# Patient Record
Sex: MALE | Race: OTHER | HISPANIC OR LATINO | URBAN - METROPOLITAN AREA
[De-identification: names, ages, dates, MRNs, and addresses within clinical notes are randomized per-mention and may not be internally consistent; named-entity substitution may affect disease eponyms.]

---

## 2019-11-18 ENCOUNTER — EMERGENCY (EMERGENCY)
Facility: HOSPITAL | Age: 44
LOS: 1 days | Discharge: HOME | End: 2019-11-18
Attending: STUDENT IN AN ORGANIZED HEALTH CARE EDUCATION/TRAINING PROGRAM
Payer: SELF-PAY

## 2019-11-18 VITALS
OXYGEN SATURATION: 98 % | WEIGHT: 169.98 LBS | DIASTOLIC BLOOD PRESSURE: 78 MMHG | TEMPERATURE: 98 F | SYSTOLIC BLOOD PRESSURE: 121 MMHG | HEIGHT: 66 IN | HEART RATE: 67 BPM | RESPIRATION RATE: 20 BRPM

## 2019-11-18 DIAGNOSIS — R07.9 CHEST PAIN, UNSPECIFIED: ICD-10-CM

## 2019-11-18 LAB
ALBUMIN SERPL ELPH-MCNC: 4.6 G/DL — SIGNIFICANT CHANGE UP (ref 3.3–5.2)
ALP SERPL-CCNC: 49 U/L — SIGNIFICANT CHANGE UP (ref 40–120)
ALT FLD-CCNC: 30 U/L — SIGNIFICANT CHANGE UP
ANION GAP SERPL CALC-SCNC: 15 MMOL/L — SIGNIFICANT CHANGE UP (ref 5–17)
AST SERPL-CCNC: 28 U/L — SIGNIFICANT CHANGE UP
BASOPHILS # BLD AUTO: 0.02 K/UL — SIGNIFICANT CHANGE UP (ref 0–0.2)
BASOPHILS NFR BLD AUTO: 0.3 % — SIGNIFICANT CHANGE UP (ref 0–2)
BILIRUB SERPL-MCNC: 0.4 MG/DL — SIGNIFICANT CHANGE UP (ref 0.4–2)
BUN SERPL-MCNC: 17 MG/DL — SIGNIFICANT CHANGE UP (ref 8–20)
CALCIUM SERPL-MCNC: 9.1 MG/DL — SIGNIFICANT CHANGE UP (ref 8.6–10.2)
CHLORIDE SERPL-SCNC: 103 MMOL/L — SIGNIFICANT CHANGE UP (ref 98–107)
CK MB CFR SERPL CALC: 4.9 NG/ML — SIGNIFICANT CHANGE UP (ref 0–6.7)
CK SERPL-CCNC: 347 U/L — HIGH (ref 30–200)
CO2 SERPL-SCNC: 21 MMOL/L — LOW (ref 22–29)
CREAT SERPL-MCNC: 0.96 MG/DL — SIGNIFICANT CHANGE UP (ref 0.5–1.3)
EOSINOPHIL # BLD AUTO: 0.1 K/UL — SIGNIFICANT CHANGE UP (ref 0–0.5)
EOSINOPHIL NFR BLD AUTO: 1.5 % — SIGNIFICANT CHANGE UP (ref 0–6)
GLUCOSE SERPL-MCNC: 92 MG/DL — SIGNIFICANT CHANGE UP (ref 70–115)
HCT VFR BLD CALC: 40.7 % — SIGNIFICANT CHANGE UP (ref 39–50)
HGB BLD-MCNC: 13.7 G/DL — SIGNIFICANT CHANGE UP (ref 13–17)
IMM GRANULOCYTES NFR BLD AUTO: 0.3 % — SIGNIFICANT CHANGE UP (ref 0–1.5)
LYMPHOCYTES # BLD AUTO: 3.2 K/UL — SIGNIFICANT CHANGE UP (ref 1–3.3)
LYMPHOCYTES # BLD AUTO: 48.8 % — HIGH (ref 13–44)
MCHC RBC-ENTMCNC: 30.2 PG — SIGNIFICANT CHANGE UP (ref 27–34)
MCHC RBC-ENTMCNC: 33.7 GM/DL — SIGNIFICANT CHANGE UP (ref 32–36)
MCV RBC AUTO: 89.8 FL — SIGNIFICANT CHANGE UP (ref 80–100)
MONOCYTES # BLD AUTO: 0.41 K/UL — SIGNIFICANT CHANGE UP (ref 0–0.9)
MONOCYTES NFR BLD AUTO: 6.3 % — SIGNIFICANT CHANGE UP (ref 2–14)
NEUTROPHILS # BLD AUTO: 2.81 K/UL — SIGNIFICANT CHANGE UP (ref 1.8–7.4)
NEUTROPHILS NFR BLD AUTO: 42.8 % — LOW (ref 43–77)
NT-PROBNP SERPL-SCNC: 12 PG/ML — SIGNIFICANT CHANGE UP (ref 0–300)
PLATELET # BLD AUTO: 253 K/UL — SIGNIFICANT CHANGE UP (ref 150–400)
POTASSIUM SERPL-MCNC: 3.7 MMOL/L — SIGNIFICANT CHANGE UP (ref 3.5–5.3)
POTASSIUM SERPL-SCNC: 3.7 MMOL/L — SIGNIFICANT CHANGE UP (ref 3.5–5.3)
PROT SERPL-MCNC: 7.5 G/DL — SIGNIFICANT CHANGE UP (ref 6.6–8.7)
RBC # BLD: 4.53 M/UL — SIGNIFICANT CHANGE UP (ref 4.2–5.8)
RBC # FLD: 12.1 % — SIGNIFICANT CHANGE UP (ref 10.3–14.5)
SODIUM SERPL-SCNC: 139 MMOL/L — SIGNIFICANT CHANGE UP (ref 135–145)
TROPONIN T SERPL-MCNC: <0.01 NG/ML — SIGNIFICANT CHANGE UP (ref 0–0.06)
WBC # BLD: 6.56 K/UL — SIGNIFICANT CHANGE UP (ref 3.8–10.5)
WBC # FLD AUTO: 6.56 K/UL — SIGNIFICANT CHANGE UP (ref 3.8–10.5)

## 2019-11-18 PROCEDURE — 71046 X-RAY EXAM CHEST 2 VIEWS: CPT | Mod: 26

## 2019-11-18 PROCEDURE — 93010 ELECTROCARDIOGRAM REPORT: CPT | Mod: 76

## 2019-11-18 PROCEDURE — 99220: CPT

## 2019-11-18 RX ORDER — METOPROLOL TARTRATE 50 MG
12.5 TABLET ORAL
Refills: 0 | Status: ACTIVE | OUTPATIENT
Start: 2019-11-18 | End: 2020-10-16

## 2019-11-18 RX ORDER — ASPIRIN/CALCIUM CARB/MAGNESIUM 324 MG
325 TABLET ORAL ONCE
Refills: 0 | Status: COMPLETED | OUTPATIENT
Start: 2019-11-18 | End: 2019-11-18

## 2019-11-18 RX ADMIN — Medication 325 MILLIGRAM(S): at 20:37

## 2019-11-18 RX ADMIN — Medication 12.5 MILLIGRAM(S): at 21:40

## 2019-11-18 NOTE — CONSULT NOTE ADULT - PROBLEM SELECTOR RECOMMENDATION 9
Monitor in CDU overnight, check CBC, BMP, trend CE x2, ECG in AM  Check FLP and A1C in AM  Will schedule for TTE in and keep NPO for Cardiac CT in AM  Start Metoprolol 12.5 mg oral BID with parameters  case d/w Dr. Segura

## 2019-11-18 NOTE — CONSULT NOTE ADULT - SUBJECTIVE AND OBJECTIVE BOX
[place] [facility]                                                        Office: [address] [address]                                                       Telephone: [phone]. Fax:[phone]                                                              CARDIOLOGY CONSULTATION NOTE                                                                                        Chief complaint:  Chest pain      HPI:     REVIEW OF SYMPTOMS: Cardiovascular:  See HPI. No chest pain,  No dyspnea,  No syncope,  No palpitations, No dizziness, No Orthopnea,      No Paroxsymal nocturnal dyspnea;  Respiratory:  No Dyspnea, No cough,     Genitourinary:  No dysuria, no hematuria; Gastrointestinal:  No nausea, no vomiting. No diarrhea.  No abdominal pain. No dark color stool, no melena ; Neurological: No headache, no dizziness, no slurred speech;  Psychiatric: No agitation, no anxiety.  ALL OTHER REVIEW OF SYSTEMS ARE NEGATIVE.    ALLERGIES: No Known Allergies    CURRENT MEDICATIONS:  Aspirin    HOME MEDICATIONS:  Pantoprazole    PAST MEDICAL HISTORY  None    PAST SURGICAL HISTORY  None    FAMILY HISTORY: Patient denies    SOCIAL HISTORY:  Denies smoking/alcohol/drugs    Vital Signs Last 24 Hrs  T(C): 36.7 (18 Nov 2019 19:05), Max: 36.7 (18 Nov 2019 19:05)  T(F): 98 (18 Nov 2019 19:05), Max: 98 (18 Nov 2019 19:05)  HR: 67 (18 Nov 2019 19:05) (67 - 67)  BP: 121/78 (18 Nov 2019 19:05) (121/78 - 121/78)  BP(mean): --  RR: 20 (18 Nov 2019 19:05) (20 - 20)  SpO2: 98% (18 Nov 2019 19:05) (98% - 98%)    PHYSICAL EXAM:  Constitutional: Comfortable and no acute distress   HEENT: Atraumatic, EOMI, neck is supple no JVD or carotid bruit   CNS: A & O x 3, Motor 5/5 b/l, No focal deficits   Respiratory: CTA b/l  Cardiovascular: S1S2 RRR, No murmur/rubs   Gastrointestinal: Soft non-tender and non distended, +Bowel sounds  Extremities: No edema, warm b/l   Psychiatric: Calm, no agitation  Skin: No skin rash/ulcers visualized to face, hands or feet.    LABS:    INTERPRETATION OF TELEMETRY: Reviewed by me.   ECG: NSR @      RADIOLOGY & ADDITIONAL STUDIES:    X-ray:  reviewed by me.   CT scan:   MRI:   ECHO FINDINGS: Date:                : LVEF=          ; RV function:       ; Valvular abnormalities: No significant valvular abnormality.  Mitral valve:           ;  Aortic valve:              ;Tricuspid valve:         ; Pulmonary pressures:        mm Hg. Pericardium:      STRESS  FINDINGS: Date:            ;      CATHETERIZATION FINDINGS:  Date:              :  LAD:                       ;  LCx:                        ; RCA:                ; [place] [facility]                                                        Office: [address] [address]                                                       Telephone: [phone]. Fax:[phone]                                                              CARDIOLOGY CONSULTATION NOTE                                                                                        Chief complaint:  Chest pain      HPI: Patient is a 44 yo M arrived from home with c/o left sided chest pain.  States that pain started about two weeks ago, left sided, sharp, 6/10 in intensity, and with occasional radiation to his back.  Patient first thought it's reflux related, started on Pantoprazole, but symptoms returned after minimal relief.  Patient admits to associated dyspnea and SOB as well. Denies HA, diaphoresis, abdominal pain, PND, orthopnea, leg edema, paresthesias or generalized weakness.  No family history of heart disease.  Currently, chest pain free.            REVIEW OF SYMPTOMS: Cardiovascular:  See HPI. + chest pain, + dyspnea, No syncope, No palpitations, No dizziness, No Orthopnea, No Paroxsymal nocturnal dyspnea;  Respiratory:  + Dyspnea, No cough, fever, or chills     Genitourinary:  No dysuria, no hematuria;   Gastrointestinal: + heart burn, No nausea, no vomiting, No diarrhea, No abdominal pain, No dark color stool, no melena;   Neurological: No headache, no dizziness, no slurred speech;    Psychiatric: No agitation, no anxiety.  ALL OTHER REVIEW OF SYSTEMS ARE NEGATIVE.    ALLERGIES: No Known Allergies    CURRENT MEDICATIONS:  Aspirin    HOME MEDICATIONS:  Pantoprazole    PAST MEDICAL HISTORY  None    PAST SURGICAL HISTORY  None    FAMILY HISTORY: Patient denies    SOCIAL HISTORY:  Denies smoking/alcohol/drugs    Vital Signs Last 24 Hrs  T(C): 36.7 (18 Nov 2019 19:05), Max: 36.7 (18 Nov 2019 19:05)  T(F): 98 (18 Nov 2019 19:05), Max: 98 (18 Nov 2019 19:05)  HR: 67 (18 Nov 2019 19:05) (67 - 67)  BP: 121/78 (18 Nov 2019 19:05) (121/78 - 121/78)  BP(mean): --  RR: 20 (18 Nov 2019 19:05) (20 - 20)  SpO2: 98% (18 Nov 2019 19:05) (98% - 98%)    PHYSICAL EXAM:  Constitutional: Comfortable and no acute distress   HEENT: Atraumatic, EOMI, neck is supple no JVD or carotid bruit   CNS: A & O x 3, Motor 5/5 b/l, No focal deficits   Respiratory: CTA b/l  Cardiovascular: S1S2 RRR, No murmur/rubs   Gastrointestinal: Soft non-tender and non distended, +Bowel sounds  Extremities: No edema, warm b/l   Psychiatric: Calm, no agitation  Skin: No skin rash/ulcers visualized to face, hands or feet.    LABS:    INTERPRETATION OF TELEMETRY: Reviewed by me.   ECG: NSR @ 73bpm with incomplete RBBB, no acute T or ST changes

## 2019-11-18 NOTE — ED ADULT NURSE NOTE - OBJECTIVE STATEMENT
pt presents to ed a&ox3, breaths even and unlabored, in no acute distress. received pt on cardiac monitor and , maintaining nsr and airway on room air. pt c/o constant L sided chest pain x 2 weeks with intermittent sob on exertion, more so while working. denies radiation of pain. pt denies cardiac hx.

## 2019-11-18 NOTE — CONSULT NOTE ADULT - ASSESSMENT
43 yo M with intermittent chest pain associated with dyspnea and back pain for past two weeks r/o cardiac etiology

## 2019-11-18 NOTE — ED PROVIDER NOTE - CLINICAL SUMMARY MEDICAL DECISION MAKING FREE TEXT BOX
Pt with recurrent exertional CP, will obtain labs, X-ray and likely place in OBS for cardiac evaluation.

## 2019-11-18 NOTE — ED ADULT NURSE NOTE - NSIMPLEMENTINTERV_GEN_ALL_ED
Implemented All Fall with Harm Risk Interventions:  [place] to call system. Call bell, personal items and telephone within reach. Instruct patient to call for assistance. Room bathroom lighting operational. Non-slip footwear when patient is off stretcher. Physically safe environment: no spills, clutter or unnecessary equipment. Stretcher in lowest position, wheels locked, appropriate side rails in place. Provide visual cue, wrist band, yellow gown, etc. Monitor gait and stability. Monitor for mental status changes and reorient to person, place, and time. Review medications for side effects contributing to fall risk. Reinforce activity limits and safety measures with patient and family. Provide visual clues: red socks.

## 2019-11-18 NOTE — ED ADULT NURSE NOTE - CAS EDN DISCHARGE ASSESSMENT
Dressing clean and dry/Patient baseline mental status/Symptoms improved/Awake/Alert and oriented to person, place and time

## 2019-11-18 NOTE — ED STATDOCS - PROGRESS NOTE DETAILS
45 y/o M pt with no significant PMHx presents to the ED c/o intermittent CP, onset 2 weeks ago. Associated symptoms include SOB x 1 week. He reports that the pain onset with no precipitating factors. The CP and SOB occur at rest without exertion. Yesterday the patient had symptoms that felt like GERD, prompting him to come to the ED today. Patient has never had a past episode of this CP occurring. Former smoker. He has also never had a stress test performed. Focused eval, protocol orders entered. Pt to be moved to main ED for complete evaluation by another provider.

## 2019-11-18 NOTE — ED PROVIDER NOTE - OBJECTIVE STATEMENT
45 y/o M pt with no significant PMHx presents to the ED c/o a "squeezing" 6/10 chest discomfort that started 2 weeks ago, as well as intermittent dyspnea that started last week. Pt expresses that yesterday he also began to have GI related symptoms, such as heart burn so he assumed he just had gas or had to burp so his daughter gave him Omeprazole which provided minimal relief. Currently he has no symptoms, but adds that when his symptoms are present it occasionally radiates to his back, and it is often relieved with rest but aggravated with exertion. No cardiac FHx. Non-Tobacco User. No further complaints at this time.

## 2019-11-19 VITALS
TEMPERATURE: 98 F | HEART RATE: 77 BPM | OXYGEN SATURATION: 95 % | DIASTOLIC BLOOD PRESSURE: 74 MMHG | RESPIRATION RATE: 18 BRPM | SYSTOLIC BLOOD PRESSURE: 107 MMHG

## 2019-11-19 DIAGNOSIS — R07.9 CHEST PAIN, UNSPECIFIED: ICD-10-CM

## 2019-11-19 LAB
ANION GAP SERPL CALC-SCNC: 12 MMOL/L — SIGNIFICANT CHANGE UP (ref 5–17)
BUN SERPL-MCNC: 19 MG/DL — SIGNIFICANT CHANGE UP (ref 8–20)
CALCIUM SERPL-MCNC: 8.8 MG/DL — SIGNIFICANT CHANGE UP (ref 8.6–10.2)
CHLORIDE SERPL-SCNC: 104 MMOL/L — SIGNIFICANT CHANGE UP (ref 98–107)
CHOLEST SERPL-MCNC: 164 MG/DL — SIGNIFICANT CHANGE UP (ref 110–199)
CO2 SERPL-SCNC: 23 MMOL/L — SIGNIFICANT CHANGE UP (ref 22–29)
CREAT SERPL-MCNC: 0.93 MG/DL — SIGNIFICANT CHANGE UP (ref 0.5–1.3)
D DIMER BLD IA.RAPID-MCNC: <150 NG/ML DDU — SIGNIFICANT CHANGE UP
GLUCOSE SERPL-MCNC: 104 MG/DL — SIGNIFICANT CHANGE UP (ref 70–115)
HBA1C BLD-MCNC: 5.6 % — SIGNIFICANT CHANGE UP (ref 4–5.6)
HDLC SERPL-MCNC: 36 MG/DL — LOW
LIPID PNL WITH DIRECT LDL SERPL: 98 MG/DL — SIGNIFICANT CHANGE UP
POTASSIUM SERPL-MCNC: 3.7 MMOL/L — SIGNIFICANT CHANGE UP (ref 3.5–5.3)
POTASSIUM SERPL-SCNC: 3.7 MMOL/L — SIGNIFICANT CHANGE UP (ref 3.5–5.3)
SODIUM SERPL-SCNC: 139 MMOL/L — SIGNIFICANT CHANGE UP (ref 135–145)
TOTAL CHOLESTEROL/HDL RATIO MEASUREMENT: 5 RATIO — SIGNIFICANT CHANGE UP (ref 3.4–9.6)
TRIGL SERPL-MCNC: 151 MG/DL — SIGNIFICANT CHANGE UP (ref 10–200)
TROPONIN T SERPL-MCNC: <0.01 NG/ML — SIGNIFICANT CHANGE UP (ref 0–0.06)
TROPONIN T SERPL-MCNC: <0.01 NG/ML — SIGNIFICANT CHANGE UP (ref 0–0.06)
TSH SERPL-MCNC: 1.08 UIU/ML — SIGNIFICANT CHANGE UP (ref 0.27–4.2)

## 2019-11-19 PROCEDURE — 75574 CT ANGIO HRT W/3D IMAGE: CPT | Mod: 26

## 2019-11-19 PROCEDURE — 93306 TTE W/DOPPLER COMPLETE: CPT

## 2019-11-19 PROCEDURE — 71046 X-RAY EXAM CHEST 2 VIEWS: CPT

## 2019-11-19 PROCEDURE — G0378: CPT

## 2019-11-19 PROCEDURE — 85379 FIBRIN DEGRADATION QUANT: CPT

## 2019-11-19 PROCEDURE — 80053 COMPREHEN METABOLIC PANEL: CPT

## 2019-11-19 PROCEDURE — 84484 ASSAY OF TROPONIN QUANT: CPT

## 2019-11-19 PROCEDURE — 99284 EMERGENCY DEPT VISIT MOD MDM: CPT | Mod: 25

## 2019-11-19 PROCEDURE — 36415 COLL VENOUS BLD VENIPUNCTURE: CPT

## 2019-11-19 PROCEDURE — 85027 COMPLETE CBC AUTOMATED: CPT

## 2019-11-19 PROCEDURE — 80048 BASIC METABOLIC PNL TOTAL CA: CPT

## 2019-11-19 PROCEDURE — 80061 LIPID PANEL: CPT

## 2019-11-19 PROCEDURE — 93005 ELECTROCARDIOGRAM TRACING: CPT

## 2019-11-19 PROCEDURE — 93010 ELECTROCARDIOGRAM REPORT: CPT | Mod: 76

## 2019-11-19 PROCEDURE — 99284 EMERGENCY DEPT VISIT MOD MDM: CPT

## 2019-11-19 PROCEDURE — 82550 ASSAY OF CK (CPK): CPT

## 2019-11-19 PROCEDURE — 83036 HEMOGLOBIN GLYCOSYLATED A1C: CPT

## 2019-11-19 PROCEDURE — 84443 ASSAY THYROID STIM HORMONE: CPT

## 2019-11-19 PROCEDURE — 82553 CREATINE MB FRACTION: CPT

## 2019-11-19 PROCEDURE — 99217: CPT

## 2019-11-19 PROCEDURE — 75574 CT ANGIO HRT W/3D IMAGE: CPT

## 2019-11-19 PROCEDURE — 83880 ASSAY OF NATRIURETIC PEPTIDE: CPT

## 2019-11-19 RX ORDER — METOPROLOL TARTRATE 50 MG
50 TABLET ORAL ONCE
Refills: 0 | Status: DISCONTINUED | OUTPATIENT
Start: 2019-11-19 | End: 2019-11-19

## 2019-11-19 NOTE — ED ADULT NURSE REASSESSMENT NOTE - NS ED NURSE REASSESS COMMENT FT1
report given to ROSALINO Jimenez in ED observation. pt transported to CDU6 by RN in stable condition.

## 2019-11-19 NOTE — ED CDU PROVIDER DISPOSITION NOTE - PATIENT PORTAL LINK FT
You can access the FollowMyHealth Patient Portal offered by [facility] by registering at the following website: [email or web address]. By joining [facility]’s FollowMyHealth portal, you will also be able to view your health information using other applications (apps) compatible with our system.

## 2019-11-19 NOTE — ED CDU PROVIDER SUBSEQUENT DAY NOTE - HISTORY
45 y/o M pt with no significant PMHx presents to the ED c/o a "squeezing" 6/10 chest discomfort that started 2 weeks ago, as well as intermittent dyspnea that started last week.  take Gi omeprazole not helping much .pain on the left side of the chest not radiating and on and off , denies any chest pain while in obs

## 2019-11-19 NOTE — ED ADULT NURSE REASSESSMENT NOTE - GENERAL PATIENT STATE
smiling/interactive/comfortable appearance/cooperative/resting/sleeping/improvement verbalized/family/SO at bedside

## 2019-11-19 NOTE — ED ADULT NURSE REASSESSMENT NOTE - NS ED NURSE REASSESS COMMENT FT1
Pt alert and oriented, no apparent distress noted at this time. Pt handed off to Reece JERRY in stable condition.

## 2019-11-19 NOTE — ED CDU PROVIDER DISPOSITION NOTE - CLINICAL COURSE
45 y/o M pt with no significant PMHx presents to the ED c/o a "squeezing" 6/10 chest discomfort that started 2 weeks ago, as well as intermittent dyspnea that started last week. Pt placed in CDU for ACS w/u. Pt with trops x 3 WNL, EKG showing RBB, Cardiac CT w/o emergent findings. Pt evaluated by SS cards who cleared pt to follow up in office with Dr. Segura. Pt advised to return to ED if symptoms return.

## 2019-11-19 NOTE — ED CDU PROVIDER SUBSEQUENT DAY NOTE - MEDICAL DECISION MAKING DETAILS
45 y/o M pt with no significant PMHx presents to the ED c/o a "squeezing" 6/10 chest discomfort that started 2 weeks ago, placed on obs for chest pain work up   card consult - cta coronary , TTE , labs - cardiac mentoring re eval

## 2019-11-19 NOTE — PROGRESS NOTE ADULT - PROBLEM SELECTOR PLAN 1
Patient came with chest pain and dyspnea.  Normal Coronary CTA.  ECHO- Normal except for Bicuspid Aortic Valve.  Recommendations:  Findings discussed with the patient and his daughter  Suggest D dimer if negative may d/c home and f/u with Dr Segura in the office

## 2019-11-19 NOTE — ED ADULT NURSE REASSESSMENT NOTE - NS ED NURSE REASSESS COMMENT FT1
assumed care of pt @ 0000, report received from Nieves JERRY, charting as noted. pt AOx4 in NAD. pt denies any chest pain or SOB at this time. HR is sinus rhythm to sinus luis on cardiac monitor. Lung sounds are clear b/l, abd is soft and nontender with positive bowel sounds in all four quadrants, skin is warm, dry and appropriate for age and race. pt educated on plan of care and observation stay. Plan of care taught back to RN. Proficiency determined from successful pt teach back. Pt oriented to unit, staff, and room. Pt reeducated on call bell use. Bed locked in lowest position, call bell within reach. All questions and concerns addressed.     pt placed on observation for serial trops/ekgs, TTE, and CTA. Pt verbalizes understanding of NPO diet for tests in AM.

## 2019-11-19 NOTE — PROGRESS NOTE ADULT - SUBJECTIVE AND OBJECTIVE BOX
[place] [facility]                                                        Office: [address] [address]                                                       Telephone: [phone]. Fax:[phone]                                                                             PROGRESS NOTE    Subjective:  Patient is feeling OK. No Chest pain.     Review of symptoms:   Cardiac:  No chest pain. No dyspnea. No palpitations.  Respiratory:no cough. No dyspnea  Gastrointestinal: No diarrhea. No abdominal pain. No bleeding.   Neuro: No focal neuro complaints.      	  Vitals:  T(C): 36.7 (11-19-19 @ 10:11), Max: 36.7 (11-18-19 @ 19:05)  HR: 57 (11-19-19 @ 10:11) (57 - 70)  BP: 104/67 (11-19-19 @ 10:11) (104/67 - 133/80)  RR: 18 (11-19-19 @ 10:11) (18 - 20)  SpO2: 96% (11-19-19 @ 10:11) (96% - 98%)  Wt(kg): --  I&O's Summary    Weight (kg): 77.1 (11-18 @ 19:05)    PHYSICAL EXAM:  Appearance: Comfortable. No acute distress  HEENT:  Head and neck: Atraumatic. Normocephalic. , Neck is supple. No JVD,   Neurologic: Alert and awake, Grossly nonfocal.   Lymphatic: No cervical lymphadenopathy  Cardiovascular: Normal S1 S2, No murmurs. No JVD,   Respiratory: Lungs clear to auscultation  Gastrointestinal:  Soft, Non-tender, + BS  Lower Extremities: No edema  Psychiatry: Patient is calm. No agitation.  Skin: No rashes.    CURRENT MEDICATIONS:    MEDICATIONS  (STANDING):  metoprolol tartrate 12.5 milliGRAM(s) Oral two times a day      LABS:	 	                          13.7   6.56  )-----------( 253      ( 18 Nov 2019 20:38 )             40.7     11-19    139  |  104  |  19.0  ----------------------------<  104  3.7   |  23.0  |  0.93    Ca    8.8      19 Nov 2019 05:14    TPro  7.5  /  Alb  4.6  /  TBili  0.4  /  DBili  x   /  AST  28  /  ALT  30  /  AlkPhos  49  11-18    proBNP: Serum Pro-Brain Natriuretic Peptide: 12 pg/mL (11-18 @ 20:38)    Lipid Profile: Date: 11-19 @ 05:14  Total cholesterol 164; Direct LDL: 98; HDL: 36; Triglycerides:151    CARDIAC MARKERS ( 19 Nov 2019 05:14 )  x     / <0.01 ng/mL / x     / x     / x      CARDIAC MARKERS ( 19 Nov 2019 01:11 )  x     / <0.01 ng/mL / x     / x     / x      CARDIAC MARKERS ( 18 Nov 2019 20:38 )  x     / <0.01 ng/mL / 347 U/L / x     / 4.9 ng/mL      LIVER FUNCTIONS - ( 18 Nov 2019 20:38 )  Alb: 4.6 g/dL / Pro: 7.5 g/dL / ALK PHOS: 49 U/L / ALT: 30 U/L / AST: 28 U/L / GGT: x             TELEMETRY: Reviewed  - No significant arrhythmias    ECG:

## 2019-11-19 NOTE — ED ADULT NURSE REASSESSMENT NOTE - NS ED NURSE REASSESS COMMENT FT1
RN called [facility] to verify lab and med orders. pt scheduled for two doses of metoprolol in AM. RN received order to cancel one time 50mg dose. Pt scheduled for labs at 0400 and 0600. Per cricket SOTOMAYOR to collect all labs @ 0400. Pt in NAD at this time. safety maintained. all needs met at this time.

## 2019-11-19 NOTE — ED CDU PROVIDER SUBSEQUENT DAY NOTE - ATTENDING CONTRIBUTION TO CARE
I, Sky Powell, performed the initial face to face bedside interview with this patient regarding history of present illness, review of symptoms and relevant past medical, social and family history.  I completed an independent physical examination.  I was the initial provider who evaluated this patient. I have signed out the follow up of any pending tests (i.e. labs, radiological studies) to the ACP.  I have communicated the patient’s plan of care and disposition with the ACP.

## 2020-06-21 NOTE — ED ADULT NURSE REASSESSMENT NOTE - RESPIRATORY ASSESSMENT
no chills/no chest pain, no SOB, no palpitations, no cough, no change in bowel/urinary habits./no fever
WDL
WDL

## 2021-03-27 PROBLEM — Z78.9 OTHER SPECIFIED HEALTH STATUS: Chronic | Status: ACTIVE | Noted: 2019-11-18

## 2021-03-30 ENCOUNTER — APPOINTMENT (OUTPATIENT)
Dept: DISASTER EMERGENCY | Facility: OTHER | Age: 46
End: 2021-03-30
Payer: COMMERCIAL

## 2021-03-30 PROCEDURE — 0011A: CPT

## 2021-04-27 ENCOUNTER — APPOINTMENT (OUTPATIENT)
Dept: DISASTER EMERGENCY | Facility: OTHER | Age: 46
End: 2021-04-27
Payer: COMMERCIAL

## 2021-04-27 PROCEDURE — 0012A: CPT

## 2024-01-16 NOTE — ED ADULT NURSE REASSESSMENT NOTE - PERIPHERAL VASCULAR WDL
up his cream for scrotal itching and he would like the prescription sent to [facility] pharmacy at [facility].  He denies fever.  He denies dysuria.  He denies diarrhea or constipation.  He denies concern for STD and states that he is in a monogamous relationship and has never had an STD.  His vital signs are normal and stable in triage.  He denies pharyngitis.  He denies skin rash.  He denies lesions on penis or scrotum but states it just itches.  He has no other cause for concern or complaint.  All vital signs are normal and stable.      History provided by:  Patient   used: No                        No data recorded                Patient History   No past medical history on file.  No past surgical history on file.  No family history on file.  Social History     Tobacco Use    Smoking status: Not on file    Smokeless tobacco: Not on file   Substance Use Topics    Alcohol use: Not on file    Drug use: Not on file       Physical Exam   ED Triage Vitals [01/15/24 2000]   Temp Heart Rate Resp BP   36.6 °C (97.9 °F) 88 18 115/76      SpO2 Temp src Heart Rate Source Patient Position   95 % -- -- --      BP Location FiO2 (%)     -- --       Physical Exam  Constitutional:       Appearance: Normal appearance.   HENT:      Head: Normocephalic and atraumatic.      Right Ear: Tympanic membrane normal.      Nose: Nose normal.      Mouth/Throat:      Mouth: Mucous membranes are moist.   Cardiovascular:      Rate and Rhythm: Normal rate and regular rhythm.      Pulses: Normal pulses.      Heart sounds: Normal heart sounds.   Pulmonary:      Effort: Pulmonary effort is normal.      Breath sounds: Normal breath sounds.   Abdominal:      General: Abdomen is flat.      
Palpations: Abdomen is soft.   Genitourinary:     Penis: Normal.       Testes: Normal.   Musculoskeletal:         General: Normal range of motion.      Cervical back: Normal range of motion and neck supple.   Skin:     General: Skin is warm and dry.      Capillary Refill: Capillary refill takes less than 2 seconds.   Neurological:      General: No focal deficit present.      Mental Status: He is alert and oriented to person, place, and time.   Psychiatric:         Mood and Affect: Mood normal.         Behavior: Behavior normal.         ED Course & MDM   Diagnoses as of 01/15/24 2048   Scrotal itching       Medical Decision Making  I requested appointment for primary care.  Patient received his refill of prescription for itching of the scrotum that he never filled in the first place.  His vital signs were normal and stable.  I did not appreciate lesion to scrotum or penis.  He was denying dysuria or hematuria.  He was denying flank pain.  At this time I did not feel he required imaging but just a refill of his clotrimazole cream.        Procedure  Procedures     Piero Gonzalez, LILIBETH-CNP  01/15/24 2048    
Pulses equal bilaterally, no edema present.
Pulses equal bilaterally, no edema present.

## 2024-10-09 NOTE — ED ADULT NURSE NOTE - GASTROINTESTINAL ASSESSMENT
There is a letter already in media for clearance of his back surgery at [facility] which was faxed to them. Please give patient those instructions, or send him a copy of the letter via [employer]. Thank you. He CAN hold Plavix.   WDL

## 2025-02-06 ENCOUNTER — NON-APPOINTMENT (OUTPATIENT)
Age: 50
End: 2025-02-06

## 2025-02-06 ENCOUNTER — APPOINTMENT (OUTPATIENT)
Dept: CARDIOLOGY | Facility: CLINIC | Age: 50
End: 2025-02-06
Payer: COMMERCIAL

## 2025-02-06 VITALS
HEART RATE: 96 BPM | SYSTOLIC BLOOD PRESSURE: 104 MMHG | OXYGEN SATURATION: 98 % | DIASTOLIC BLOOD PRESSURE: 70 MMHG | BODY MASS INDEX: 29.49 KG/M2 | HEIGHT: 65 IN | WEIGHT: 177 LBS

## 2025-02-06 DIAGNOSIS — Q23.81 BICUSPID AORTIC VALVE: ICD-10-CM

## 2025-02-06 DIAGNOSIS — I35.1 NONRHEUMATIC AORTIC (VALVE) INSUFFICIENCY: ICD-10-CM

## 2025-02-06 PROBLEM — Z00.00 ENCOUNTER FOR PREVENTIVE HEALTH EXAMINATION: Status: ACTIVE | Noted: 2025-02-06

## 2025-02-06 PROCEDURE — G2211 COMPLEX E/M VISIT ADD ON: CPT | Mod: NC

## 2025-02-06 PROCEDURE — 99204 OFFICE O/P NEW MOD 45 MIN: CPT

## 2025-02-06 PROCEDURE — 93000 ELECTROCARDIOGRAM COMPLETE: CPT

## 2025-02-06 NOTE — CARDIOLOGY SUMMARY
[de-identified] : 2/6/25- Sinus 83, normal axis, iRBBB, QTc 406 [de-identified] : 11/2019: LV EF 60-65%, bicuspid AV with sclerosis and mild AI  [de-identified] : 11/2019:  CCTA no CAD, zero Ca-score

## 2025-02-06 NOTE — HISTORY OF PRESENT ILLNESS
[FreeTextEntry1] : 49M Persian-speaking presented to [facility] in 11/2019 with chest pain had cardiac CTA done no CAD and Echo with bicuspid AV with mild AV regurgitation, presents for cardiology evaluation.   Patient presents with his daughter (Noemí who is a CTS PA at [employer]) for the visit interpreting for him, reports feeling well, no cardiac complains, baseline active with his work without any exertional discomfort, denies headache. Has outside PCP reportedly blood work with normal cholesterol.   No CAD/stroke or valvular heart disease in family, denies aneurysm Nonsmoker, no alcohol use Working in construction

## 2025-02-06 NOTE — DISCUSSION/SUMMARY
[FreeTextEntry1] : 49M Bel[place]ian-speaking presented to [facility] in 11/2019 with chest pain had cardiac CTA done no CAD and Echo with bicuspid AV with mild AV regurgitation, presents for cardiology evaluation.   Incidental bicuspid AV with mild AI from 2019, on exam there is no murmur, will repeat Echocardiogram to reassess if progression of AI, discussed about earlier degeneration of BAV compared to general population for AS/AI, would need serial surveillance Echo to monitor in the long term. Dental antibiotic prophylaxis is not indicated for bicuspid AV. EKG no ischemic abnormality and no anginal complains, not indicated for stress testing.    Follow up in 1 year or sooner if new cardiac issue arise.  [EKG obtained to assist in diagnosis and management of assessed problem(s)] : EKG obtained to assist in diagnosis and management of assessed problem(s)

## 2025-02-27 ENCOUNTER — APPOINTMENT (OUTPATIENT)
Dept: CARDIOLOGY | Facility: CLINIC | Age: 50
End: 2025-02-27
Payer: COMMERCIAL

## 2025-02-27 PROCEDURE — 93306 TTE W/DOPPLER COMPLETE: CPT

## 2025-04-15 ENCOUNTER — NON-APPOINTMENT (OUTPATIENT)
Age: 50
End: 2025-04-15

## 2025-04-15 ENCOUNTER — APPOINTMENT (OUTPATIENT)
Dept: INTERNAL MEDICINE | Facility: CLINIC | Age: 50
End: 2025-04-15
Payer: COMMERCIAL

## 2025-04-15 VITALS
SYSTOLIC BLOOD PRESSURE: 126 MMHG | DIASTOLIC BLOOD PRESSURE: 78 MMHG | HEART RATE: 80 BPM | TEMPERATURE: 97 F | WEIGHT: 175 LBS | OXYGEN SATURATION: 97 % | HEIGHT: 65 IN | BODY MASS INDEX: 29.16 KG/M2

## 2025-04-15 DIAGNOSIS — H52.4 PRESBYOPIA: ICD-10-CM

## 2025-04-15 DIAGNOSIS — Z23 ENCOUNTER FOR IMMUNIZATION: ICD-10-CM

## 2025-04-15 DIAGNOSIS — Z00.00 ENCOUNTER FOR GENERAL ADULT MEDICAL EXAMINATION W/OUT ABNORMAL FINDINGS: ICD-10-CM

## 2025-04-15 PROCEDURE — 93000 ELECTROCARDIOGRAM COMPLETE: CPT

## 2025-04-15 PROCEDURE — 99386 PREV VISIT NEW AGE 40-64: CPT | Mod: GC,25

## 2025-04-15 PROCEDURE — 36415 COLL VENOUS BLD VENIPUNCTURE: CPT

## 2025-04-15 PROCEDURE — 90471 IMMUNIZATION ADMIN: CPT

## 2025-04-15 PROCEDURE — 90715 TDAP VACCINE 7 YRS/> IM: CPT

## 2025-04-15 NOTE — PLAN
[FreeTextEntry1] : #Bicuspid aortic valve #aortic regurg follows with cardiology EKG reviewed, mild RBBB otherwise NSR Echo reviewed, no aortic stenosis, aorta root measures 1.65 cm/m advised patient to continue following with cardiology and advise his children and siblings to get evaluation as bicuspid aorta usually hereditary  #Presbyopia pt reports trouble reading small print up close, normal visual acuity otherwise given optometrist referral  #Health Maintenance never had colonoscopy, given referral today Routine EKG performed today, noted as above Routine labs sent including CBC, CMP, A1C, Lipid Profile, TSH w/ reflex, HIV, Hep C, UA administered tdap today advised patient to go to pharmacy for shingles vaccine

## 2025-04-15 NOTE — HEALTH RISK ASSESSMENT
[No] : No [PHQ-2 Negative - No further assessment needed] : PHQ-2 Negative - No further assessment needed [Never] : Never [HIV Test offered] : HIV Test offered [Hepatitis C test offered] : Hepatitis C test offered [Excellent] : ~his/her~  mood as  excellent [No falls in past year] : Patient reported no falls in the past year [0] : 2) Feeling down, depressed, or hopeless: Not at all (0) [With Family] : lives with family [Employed] : employed [Feels Safe at Home] : Feels safe at home [Fully functional (bathing, dressing, toileting, transferring, walking, feeding)] : Fully functional (bathing, dressing, toileting, transferring, walking, feeding) [Fully functional (using the telephone, shopping, preparing meals, housekeeping, doing laundry, using] : Fully functional and needs no help or supervision to perform IADLs (using the telephone, shopping, preparing meals, housekeeping, doing laundry, using transportation, managing medications and managing finances) [Reports changes in vision] : Reports changes in vision [Smoke Detector] : smoke detector [Carbon Monoxide Detector] : carbon monoxide detector [Safety elements used in home] : safety elements used in home [Seat Belt] :  uses seat belt [[id number]] : 0 [NO] : No [Change in mental status noted] : No change in mental status noted [Language] : denies difficulty with language [Behavior] : denies difficulty with behavior [Learning/Retaining New Information] : denies difficulty learning/retaining new information [Handling Complex Tasks] : denies difficulty handling complex tasks [Reasoning] : denies difficulty with reasoning [Spatial Ability and Orientation] : denies difficulty with spatial ability and orientation [Reports changes in hearing] : Reports no changes in hearing [Reports normal functional visual acuity (ie: able to read med bottle)] : Reports poor functional visual acuity.  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

## 2025-04-15 NOTE — HEALTH RISK ASSESSMENT
[No] : No [PHQ-2 Negative - No further assessment needed] : PHQ-2 Negative - No further assessment needed [Never] : Never [HIV Test offered] : HIV Test offered [Hepatitis C test offered] : Hepatitis C test offered [Excellent] : ~his/her~  mood as  excellent [No falls in past year] : Patient reported no falls in the past year [0] : 2) Feeling down, depressed, or hopeless: Not at all (0) [With Family] : lives with family [Employed] : employed [Feels Safe at Home] : Feels safe at home [Fully functional (bathing, dressing, toileting, transferring, walking, feeding)] : Fully functional (bathing, dressing, toileting, transferring, walking, feeding) [Fully functional (using the telephone, shopping, preparing meals, housekeeping, doing laundry, using] : Fully functional and needs no help or supervision to perform IADLs (using the telephone, shopping, preparing meals, housekeeping, doing laundry, using transportation, managing medications and managing finances) [Reports changes in vision] : Reports changes in vision [Smoke Detector] : smoke detector [Carbon Monoxide Detector] : carbon monoxide detector [Safety elements used in home] : safety elements used in home [Seat Belt] :  uses seat belt [[id number]] : 0 [NO] : No [Change in mental status noted] : No change in mental status noted [Language] : denies difficulty with language [Behavior] : denies difficulty with behavior [Learning/Retaining New Information] : denies difficulty learning/retaining new information [Handling Complex Tasks] : denies difficulty handling complex tasks [Reasoning] : denies difficulty with reasoning [Spatial Ability and Orientation] : denies difficulty with spatial ability and orientation [Reports changes in hearing] : Reports no changes in hearing [Reports normal functional visual acuity (ie: able to read med bottle)] : Reports poor functional visual acuity.  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

## 2025-04-15 NOTE — HISTORY OF PRESENT ILLNESS
[FreeTextEntry1] : cpe [de-identified] : 50 y/o M PMH bicuspid aortic value presents to establish care. Patient complains of difficulty reading small print up close, reports normal visual acuity otherwise. No other acute complaints.

## 2025-04-15 NOTE — HISTORY OF PRESENT ILLNESS
[FreeTextEntry1] : cpe [de-identified] : 50 y/o M PMH bicuspid aortic value presents to establish care. Patient complains of difficulty reading small print up close, reports normal visual acuity otherwise. No other acute complaints.

## 2025-04-15 NOTE — COUNSELING
[Behavioral health counseling provided] : Behavioral health counseling provided [Sleep ___ hours/day] : Sleep [unfilled] hours/day [Engage in a relaxing activity] : Engage in a relaxing activity [Benefits of weight loss discussed] : Benefits of weight loss discussed [Encouraged to increase physical activity] : Encouraged to increase physical activity [None] : None [Good understanding] : Patient has a good understanding of lifestyle changes and steps needed to achieve self management goal

## 2025-04-16 LAB
ALBUMIN SERPL ELPH-MCNC: 4.7 G/DL
ALP BLD-CCNC: 56 U/L
ALT SERPL-CCNC: 40 U/L
ANION GAP SERPL CALC-SCNC: 13 MMOL/L
APPEARANCE: CLEAR
AST SERPL-CCNC: 30 U/L
BASOPHILS # BLD AUTO: 0.01 K/UL
BASOPHILS NFR BLD AUTO: 0.2 %
BILIRUB SERPL-MCNC: 0.6 MG/DL
BILIRUBIN URINE: NEGATIVE
BLOOD URINE: NEGATIVE
BUN SERPL-MCNC: 16 MG/DL
CALCIUM SERPL-MCNC: 9.3 MG/DL
CHLORIDE SERPL-SCNC: 106 MMOL/L
CHOLEST SERPL-MCNC: 191 MG/DL
CO2 SERPL-SCNC: 22 MMOL/L
COLOR: YELLOW
CREAT SERPL-MCNC: 0.9 MG/DL
CREAT SPEC-SCNC: 37 MG/DL
EGFRCR SERPLBLD CKD-EPI 2021: 103 ML/MIN/1.73M2
EOSINOPHIL # BLD AUTO: 0.13 K/UL
EOSINOPHIL NFR BLD AUTO: 2.3 %
ESTIMATED AVERAGE GLUCOSE: 120 MG/DL
GLUCOSE QUALITATIVE U: NEGATIVE MG/DL
GLUCOSE SERPL-MCNC: 101 MG/DL
HBA1C MFR BLD HPLC: 5.8 %
HCT VFR BLD CALC: 43.4 %
HCV AB SER QL: NONREACTIVE
HCV S/CO RATIO: 0.1 S/CO
HDLC SERPL-MCNC: 45 MG/DL
HGB BLD-MCNC: 14.8 G/DL
IMM GRANULOCYTES NFR BLD AUTO: 0.4 %
KETONES URINE: NEGATIVE MG/DL
LDLC SERPL-MCNC: 127 MG/DL
LEUKOCYTE ESTERASE URINE: NEGATIVE
LYMPHOCYTES # BLD AUTO: 2.3 K/UL
LYMPHOCYTES NFR BLD AUTO: 41.4 %
MAN DIFF?: NORMAL
MCHC RBC-ENTMCNC: 30.5 PG
MCHC RBC-ENTMCNC: 34.1 G/DL
MCV RBC AUTO: 89.3 FL
MICROALBUMIN 24H UR DL<=1MG/L-MCNC: <1.2 MG/DL
MICROALBUMIN/CREAT 24H UR-RTO: NORMAL MG/G
MONOCYTES # BLD AUTO: 0.4 K/UL
MONOCYTES NFR BLD AUTO: 7.2 %
NEUTROPHILS # BLD AUTO: 2.7 K/UL
NEUTROPHILS NFR BLD AUTO: 48.5 %
NITRITE URINE: NEGATIVE
NONHDLC SERPL-MCNC: 146 MG/DL
PH URINE: 6
PLATELET # BLD AUTO: 267 K/UL
POTASSIUM SERPL-SCNC: 4.1 MMOL/L
PROT SERPL-MCNC: 7.5 G/DL
PROTEIN URINE: NEGATIVE MG/DL
PSA SERPL-MCNC: 0.38 NG/ML
RBC # BLD: 4.86 M/UL
RBC # FLD: 13 %
SODIUM SERPL-SCNC: 141 MMOL/L
SPECIFIC GRAVITY URINE: 1.01
TRIGL SERPL-MCNC: 105 MG/DL
TSH SERPL-ACNC: 1.89 UIU/ML
UROBILINOGEN URINE: 0.2 MG/DL
WBC # FLD AUTO: 5.56 K/UL

## 2025-04-17 LAB — HIV1+2 AB SPEC QL IA.RAPID: NONREACTIVE
